# Patient Record
Sex: FEMALE | Race: WHITE | NOT HISPANIC OR LATINO | Employment: FULL TIME | URBAN - METROPOLITAN AREA
[De-identification: names, ages, dates, MRNs, and addresses within clinical notes are randomized per-mention and may not be internally consistent; named-entity substitution may affect disease eponyms.]

---

## 2018-07-31 ENCOUNTER — OFFICE VISIT - RIVER FALLS (OUTPATIENT)
Dept: FAMILY MEDICINE | Facility: CLINIC | Age: 38
End: 2018-07-31

## 2018-07-31 ASSESSMENT — MIFFLIN-ST. JEOR: SCORE: 1792.14

## 2018-09-24 ENCOUNTER — OFFICE VISIT - RIVER FALLS (OUTPATIENT)
Dept: FAMILY MEDICINE | Facility: CLINIC | Age: 38
End: 2018-09-24

## 2018-09-24 ASSESSMENT — MIFFLIN-ST. JEOR: SCORE: 1836.59

## 2018-09-25 ENCOUNTER — AMBULATORY - RIVER FALLS (OUTPATIENT)
Dept: FAMILY MEDICINE | Facility: CLINIC | Age: 38
End: 2018-09-25

## 2022-02-11 VITALS
BODY MASS INDEX: 45.04 KG/M2 | HEART RATE: 72 BPM | WEIGHT: 263.8 LBS | HEIGHT: 64 IN | SYSTOLIC BLOOD PRESSURE: 122 MMHG | DIASTOLIC BLOOD PRESSURE: 64 MMHG

## 2022-02-11 VITALS
HEIGHT: 64 IN | WEIGHT: 254 LBS | SYSTOLIC BLOOD PRESSURE: 124 MMHG | DIASTOLIC BLOOD PRESSURE: 62 MMHG | BODY MASS INDEX: 43.36 KG/M2 | HEART RATE: 74 BPM

## 2022-02-16 NOTE — PROGRESS NOTES
Patient:   SCARLET DIAMOND            MRN: [id number]            FIN: [id number]               Age:   37 years     Sex:  Female     :  1980   Associated Diagnoses:   Seasonal allergies; Snoring   Author:   John Marmolejo MD      Chief Complaint   2018 2:57 PM CDT    discuss some breathing issues, needs her ears flushed      History of Present Illness   see chief complaint as noted above and confirmed with the patient   37 year old female presenting with multiple concerns.    She has been hearing crinkling in her ears for a couple months, feels she needs to have her ears cleaned. Her nose has been congested, throat is stratchy and thick feeling. Does get some chest pain when she coughs. She is a mouth breather and feels it's gotten worse with weight gain. She has been snoring at night but is unsure if it's from her nasal congestion or if she does it all the time. Her dad has sleep apnea.      Review of Systems   Constitutional:  No fever, No chills.    Ear/Nose/Mouth/Throat:  Nasal congestion, Sore throat, No ear wax.    Respiratory:  Cough, Snoring, No shortness of breath.    Cardiovascular:  No chest pain.    Gastrointestinal:  No nausea, No vomiting.    Musculoskeletal:  No back pain.    Integumentary:  No rash.    Neurologic:  Headache.    Psychiatric:  Sleeping problems.              Health Status   Allergies:    Allergic Reactions (Selected)  No known allergies   Medications:  (Selected)   Prescriptions  Prescribed  Kariva oral tablet: 1 tab(s), po, daily, # 84 tab(s), 3 Refill(s), Type: Maintenance, Pharmacy: [facility]   Problem list:    All Problems  Obese / ICD-9-.00 / Probable  Inactive: Menarche / ICD-9-CM V21.8  Resolved: spontaneous   Resolved: pregnancy induced HTN  Resolved: Pregnancy / SNOMED CT 127393460  Resolved: Pregnancy / SNOMED CT 146344973  Resolved: Pregnancy / SNOMED CT 467028858      Histories   Past Medical History:    Resolved  spontaneous :  Onset in  at 25 years.  Resolved.  pregnancy induced HTN:  Resolved.  Pregnancy (326568840):  Resolved on 2007 at 26 years.  Pregnancy (575021341):  Resolved on 5/15/2010 at 29 years.  Pregnancy (642242908):  Resolved in  at 25 years.   Family History:    No family history items have been selected or recorded.   Procedure history:    Childbirth ([id number]) on 5/15/2010 at 29 Years.  Comments:  2010 7:08 PM - Leah Rudd (female)  Pre-eclampsia, mild  Miscarriage ([id number]) in 2006 at 26 Years.  suction dilation and curettage in 2006 at 26 Years.   Social History:        Alcohol Assessment: Current            Current, Beer (12 oz), 1-2 times per month      Tobacco Assessment: Denies Tobacco Use      Exercise and Physical Activity Assessment: Regular exercise            Exercise frequency: 3-4 times/week.        Physical Examination   Vital Signs   2018 2:57 PM CDT Peripheral Pulse Rate 74 bpm    Systolic Blood Pressure 124 mmHg    Diastolic Blood Pressure 62 mmHg    Mean Arterial Pressure 83 mmHg      Measurements from flowsheet : Measurements   2018 2:57 PM CDT Height Measured - Standard 64 in    Weight Measured - Standard 254.0 lb    BSA 2.28 m2    Body Mass Index 43.59 kg/m2  HI      General:  Alert and oriented, No acute distress.    Eye:  Pupils are equal, round and reactive to light, Normal conjunctiva.    HENT:  Oral mucosa is moist.    Neck:  Supple.    Respiratory:  Respirations are non-labored.    Cardiovascular:  Normal rate, Regular rhythm, No edema.    Gastrointestinal:  Non-distended.    Musculoskeletal:  Normal gait.    Integumentary:  Warm, No rash.    Psychiatric:  Cooperative, Appropriate mood & affect, Normal judgment.       Review / Management   Results review      Impression and Plan   Diagnosis     Seasonal allergies (QLD35-IP J30.2).     Snoring (RGO58-YK R06.83).     Seasonal allergies (URS00-RD J30.2).     Course:  she probably has some element of GAURI  but I think treating the allergic rhinitis and see if her symptoms persist is the first stop but I told her I expect to order a sleep test for he in a couple of weeks.    Plan:  Discussed her symptoms are consistant with seasonal allergies. Will treat her allergies and if she continues to snore, continues to have problems sleeping and having fatigue during the day we will have her set up for a sleep study.  I, Vicki Torres [facility], acted solely as a scribe for, and in the presence of Dr. John Marmolejo who performed the service..

## 2022-02-16 NOTE — PROGRESS NOTES
Patient:   SCARLET DIAMOND            MRN: [id number]            FIN: [id number]               Age:   37 years     Sex:  Female     :  1980   Associated Diagnoses:   Seasonal allergies; Obese; Snoring; Daytime sleepiness   Author:   John Marmolejo MD      Chief Complaint   2018 2:31 PM CDT    f/u sleep Study      History of Present Illness   Patient presents in follow up after having a polysomnogram on 9/10/18  Reason for test:  snoring, breathing through her mouth, and family history of sleep apnea    Results / AHI:  4.0 per hour. lowest sat 87%She states that during the test her daughter was sick and she was woken up 5 different times throughout the night.    Allergies: have improved since starting flonase.      Review of Systems   Constitutional:  Fatigue, No fever, No chills.    Ear/Nose/Mouth/Throat:  Nasal congestion.    Respiratory:  Snoring, No shortness of breath, No apnea, No sleep apnea.    Cardiovascular:  No chest pain.    Gastrointestinal:  No nausea, No vomiting.    Musculoskeletal:  No back pain.    Integumentary:  No rash.    Neurologic:  No headache.    Psychiatric:  Sleeping problems.              Health Status   Allergies:    Allergic Reactions (Selected)  No known allergies   Medications:  (Selected)   Prescriptions  Prescribed  Flonase 50 mcg/inh nasal spray: See Instructions, Instructions: 1 spray Nasal bid for 2 weeks, than continue 1 spray daily, # 16 gm, 1 Refill(s), Type: Maintenance, Pharmacy: [id number], 1 spray Nasal bid for 2 weeks, than continue 1 spray daily  Kariva oral tablet: 1 tab(s), po, daily, # 84 tab(s), 3 Refill(s), Type: Maintenance, Pharmacy: [facility]   Problem list:    All Problems  Obese / ICD-9-.00 / Probable  Inactive: Menarche / ICD-9-CM V21.8  Resolved: spontaneous   Resolved: pregnancy induced HTN  Resolved: Pregnancy / SNOMED CT 729349067  Resolved: Pregnancy / SNOMED CT 760900828  Resolved: Pregnancy / SNOMED CT  376998101      Histories   Past Medical History:    Resolved  spontaneous : Onset in  at 25 years.  Resolved.  pregnancy induced HTN:  Resolved.  Pregnancy (403151884):  Resolved on 2007 at 26 years.  Pregnancy (124168559):  Resolved on 5/15/2010 at 29 years.  Pregnancy (971502534):  Resolved in  at 25 years.   Family History:    Diabetes  Grandmother (P)     Procedure history:    Childbirth ([id number]) on 5/15/2010 at 29 Years.  Comments:  2010 7:08 PM - Leah Rudd   (female)  Pre-eclampsia, mild  Miscarriage (42570709) in 2006 at 26 Years.  suction dilation and curettage in 2006 at 26 Years.   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      Home and Environment Assessment            Marital status: .  Spouse/Partner name: Juan Mcnamara.      Nutrition and Health Assessment            Type of diet: Regular.      Exercise and Physical Activity Assessment            Exercise frequency: 1-2 times/week.  Exercise type: Walking.      Sexual Assessment            Sexually active: Yes.  Identifies as female, Sexual orientation: Straight or heterosexual.  Uses condoms: Yes.        Physical Examination   Vital Signs   2018 2:31 PM CDT Peripheral Pulse Rate 72 bpm    Systolic Blood Pressure 122 mmHg    Diastolic Blood Pressure 64 mmHg    Mean Arterial Pressure 83 mmHg      Measurements from flowsheet : Measurements   2018 2:31 PM CDT Height Measured - Standard 64 in    Weight Measured - Standard 263.8 lb    BSA 2.32 m2    Body Mass Index 45.28 kg/m2  HI      General:  Alert and oriented, No acute distress.    Eye:  Pupils are equal, round and reactive to light, Normal conjunctiva.    HENT:   Oral mucosa is moist.    Neck:  Supple.    Respiratory:  Respirations are non-labored.    Cardiovascular:  Normal rate, Regular rhythm, No edema.    Gastrointestinal:  Non-distended.    Musculoskeletal:  Normal gait.    Integumentary:  Warm, No rash.    Psychiatric:  Cooperative, Appropriate mood & affect, Normal judgment.       Impression and Plan   Diagnosis     Seasonal allergies (MPE28-NA J30.2).     Obese (SEN49-KY E66.9).     Snoring (EDR39-HX R06.83).     Daytime sleepiness (JPK18-AL R40.0).     Course:  Reviewed patients study and its significance..    Plan:  Discussed sleep study results and that they are on the border of being positive. Will have her continue using flonase on a daily basis. Discussed sleeping habits and that she should get more sleep at night. Will have her conitnue to treat her allergies, work on loosing weight and don't use tobacco. These things will help.  offered doing in lab testing due to her symptoms and family history. She will think about this and will let us know if she decides to go forward with test.  Will follow up in approximately 2 months to determine effectiveness of therapy.   , I, Vicki Torres [facility], acted solely as a scribe for, and in the presence of Dr. John Marmolejo who performed the service..

## 2022-09-07 ENCOUNTER — ANCILLARY PROCEDURE (OUTPATIENT)
Dept: MAMMOGRAPHY | Facility: HOSPITAL | Age: 42
End: 2022-09-07
Payer: COMMERCIAL

## 2022-09-07 DIAGNOSIS — Z12.31 SCREENING MAMMOGRAM, ENCOUNTER FOR: ICD-10-CM

## 2022-09-07 PROCEDURE — 77067 SCR MAMMO BI INCL CAD: CPT

## 2022-10-03 ENCOUNTER — HEALTH MAINTENANCE LETTER (OUTPATIENT)
Age: 42
End: 2022-10-03

## 2023-10-22 ENCOUNTER — HEALTH MAINTENANCE LETTER (OUTPATIENT)
Age: 43
End: 2023-10-22

## 2023-12-31 ENCOUNTER — HEALTH MAINTENANCE LETTER (OUTPATIENT)
Age: 43
End: 2023-12-31

## 2024-12-14 ENCOUNTER — HEALTH MAINTENANCE LETTER (OUTPATIENT)
Age: 44
End: 2024-12-14